# Patient Record
Sex: FEMALE | Race: WHITE | NOT HISPANIC OR LATINO | Employment: UNEMPLOYED | URBAN - NONMETROPOLITAN AREA
[De-identification: names, ages, dates, MRNs, and addresses within clinical notes are randomized per-mention and may not be internally consistent; named-entity substitution may affect disease eponyms.]

---

## 2017-09-23 ENCOUNTER — HOSPITAL ENCOUNTER (EMERGENCY)
Facility: HOSPITAL | Age: 37
Discharge: HOME | End: 2017-09-23
Admitting: EMERGENCY MEDICINE

## 2017-09-23 VITALS
TEMPERATURE: 98.8 F | HEART RATE: 93 BPM | SYSTOLIC BLOOD PRESSURE: 133 MMHG | HEIGHT: 64 IN | RESPIRATION RATE: 20 BRPM | WEIGHT: 150 LBS | DIASTOLIC BLOOD PRESSURE: 77 MMHG | BODY MASS INDEX: 25.61 KG/M2 | OXYGEN SATURATION: 98 %

## 2017-09-23 DIAGNOSIS — L02.91 ABSCESS: Primary | ICD-10-CM

## 2017-09-23 PROCEDURE — 99282 EMERGENCY DEPT VISIT SF MDM: CPT

## 2017-09-23 RX ORDER — CLINDAMYCIN HYDROCHLORIDE 300 MG/1
300 CAPSULE ORAL 3 TIMES DAILY
Qty: 21 CAPSULE | Refills: 0 | Status: SHIPPED | OUTPATIENT
Start: 2017-09-23 | End: 2017-09-30

## 2017-09-23 RX ORDER — PRENATAL VIT NO.126/IRON/FOLIC 28MG-0.8MG
TABLET ORAL DAILY
COMMUNITY
End: 2023-03-03

## 2017-09-23 NOTE — DISCHARGE INSTRUCTIONS
Please f/u with your OBGYN on Monday  Warm compresses to affected area 3-4x daily  R/t to the ER if any new or worsening symptoms

## 2017-09-24 NOTE — ED PROVIDER NOTES
Subjective   HPI Comments: Patient is a 37-year-old  female who is 22 weeks pregnant who presents ER today with complaint of vaginal abscess.  The patient reports that this is been occurring for the past 2 days.  The patient states that she did shave her vaginal area 3 days ago and the next day she began have some pain and swelling to the right side of her vaginal area.  The patient reports no drainage from the area.  The patient denies any discharge.  She denies any vaginal bleeding abdominal or back pain.  Patient presents ER today for further evaluation.    Patient is a 37 y.o. female presenting with female genitourinary complaint.   History provided by:  Patient   used: No    Female  Problem   Primary symptoms comment: vaginal abscess. There has been no fever. The current episode started 2 days ago. The problem occurs constantly. The problem has not changed since onset.Context: occurred after shaving area. She is pregnant (22 weeks pregnant). Prenatal care has been sporadic. Pertinent negatives include no anorexia, no diaphoresis, no abdominal swelling, no abdominal pain, no constipation, no diarrhea, no nausea, no vomiting, no frequency, no light-headedness and no dizziness. She has tried nothing for the symptoms. Sexual activity: non-contributory. She uses nothing for contraception. Associated medical issues do not include STD, PID, herpes simplex, perineal abscess, vaginosis, hypermenorrhea, ovarian cysts, endometriosis, gynecological surgery, ectopic pregnancy,  section, miscarriage or terminated pregnancy.       Review of Systems   Constitutional: Negative for diaphoresis.   Gastrointestinal: Negative for abdominal pain, anorexia, constipation, diarrhea, nausea and vomiting.   Genitourinary: Negative for frequency.   Neurological: Negative for dizziness and light-headedness.   All other systems reviewed and are negative.      History reviewed. No pertinent past medical  history.    No Known Allergies    Past Surgical History:   Procedure Laterality Date   •  SECTION     • FOOT SURGERY     • HAND SURGERY         History reviewed. No pertinent family history.    Social History     Social History   • Marital status:      Spouse name: N/A   • Number of children: N/A   • Years of education: N/A     Social History Main Topics   • Smoking status: Current Every Day Smoker     Packs/day: 0.50     Years: 10.00     Types: Cigarettes   • Smokeless tobacco: None   • Alcohol use No   • Drug use: No   • Sexual activity: Not Asked     Other Topics Concern   • None     Social History Narrative   • None           Objective   Physical Exam   Constitutional: She is oriented to person, place, and time. She appears well-developed and well-nourished.   HENT:   Head: Normocephalic and atraumatic.   Eyes: Conjunctivae are normal. Pupils are equal, round, and reactive to light.   Neck: Normal range of motion.   Cardiovascular: Normal rate, regular rhythm and normal heart sounds.    Pulmonary/Chest: Effort normal and breath sounds normal.   Abdominal: Soft. Bowel sounds are normal.   Genitourinary:         Musculoskeletal: Normal range of motion.   Neurological: She is alert and oriented to person, place, and time.   Skin: Skin is warm and dry.   Psychiatric: She has a normal mood and affect.   Nursing note and vitals reviewed.      Procedures         ED Course  ED Course   Comment By Time   I did offer to try to I&D the lesion patient was concerned about to the right labia majora.  The patient declined.  This time and treated with antibiotics.  Advised the patient that I recommend using Tylenol for pain she is pregnant.  Fetal heart tones were performed there 145.  The patient is advised to use sitz baths in warm compresses to the affected area.  Advised to follow-up with her OB/GYN on Monday or have advised the patient should return to this ER for further evaluation.  At this time she will  be discharged home in stable condition. Yue Owen, APRN 09/24 1853                  MDM  Number of Diagnoses or Management Options  Abscess: new and requires workup  Patient Progress  Patient progress: stable      Final diagnoses:   Abscess            Yue Owen, APRN  09/24/17 1962

## 2023-03-02 NOTE — DISCHARGE INSTRUCTIONS
UPPER EXTREMITY POST-OP INSTRUCTIONS - DR. HE    IMPORTANT PHONE NUMBERS:   For emergencies, please call [phone]   You may reach Dr. He and clinical staff at [phone]- M-F 8:00 am-5:00 pm   After 5pm or on the weekends, please call [phone]   Call immediately if you have any of the following symptoms:     Elevated temperature above 101.5 degrees for more than 48 hours after surgery     Persistent drainage from wound     Severe pain around surgical site    Sling use: The sling is provided for your comfort and to ensure proper healing of your repair following surgery. Please place the abduction pillow with the curved side against your side and the sling on the side of the pillow. Your surgery requires that you wear the sling if noted below.  ____ For comfort. Remove sling 24 hours and begin range of motion exercises  ____ At all times except bathing, dressing, and therapy. Also wear the sling during sleep.  _x___ No sling required    Bathing:  ___No bandages, no restrictions!!  _x__You may remove you dressing and shower on the 3rd day after surgery (Ex. Tu surgery, shower on Friday)  ** if you are told to it is ok to remove your dressing and shower, DO NOT SOAK your incisions in a tub.  ___Keep splint clean, dry, and intact. DO NOT place foreign objects into your splint.      Dressings: Keep dressing/splint intact unless instructed otherwise below. SOME DRAINAGE IS NORMAL!     DO NOT touch or apply ointment to the incision.     DO NOT remove the steri-strips over the incisions (if you have steri-strips). They will         generally fall off on their own or can be removed 1 weeksafter surgery.     If you have yellow gauze and it comes off, do not worry about it. Leave them off.    Signs of infection that warrant a phone call to our clinical line:     o Excessive drainage or redness     o Red streaking coming away from the incision  o Increased pain  o Increased temperature above 101  degrees      Physical Therapy:        *  Your physical therapy status will be discussed with you postoperatively and at your first post-op appointment. Some injuries will not require physical therapy.      *  If you have a shoulder manipulation, please schedule therapy for the next day      Medications: You will be discharged with the appropriate medications following your surgery. Fill these at the pharmacy and take them as directed on the label. Not all of the medications below may be prescribed. Occasionally, other medications may be prescribed with specific instructions.    Percocet/Lortab (oxycodone/hydrocodone with tylenol) - Pain Medication, will cause drowsiness, possibly itchiness (this is NOT an allergy - use benadryl or an over the counter allergy medication such as Claritin or Zyrtec)     o Take 1-2 tablets every 4-6 hours. DO NOT EXCEED 4,000mg of Tylenol in 24 hours.  **DO NOT MIX WITH ALCOHOL, DRIVE WHILE TAKING, OR TAKE with extra TYLENOL**    Colace (Docusate) - stool softener, used for constipation. Take this only if you feel constipated.      Zofran (Ondansetron) or Phenergan - Anti-nausea medication, will cause drowsiness      *Starting January 2021, all narcotic medication must be prescribed electronically to your pharmacy.  Be sure to notify nursing of your preferred pharmacy.  If you are running low on pain medications, please notify us if you need a refill 24-48 hours prior to when you run out, so we can make arrangements to refill the prescription for you if we determine is necessary

## 2023-03-03 ENCOUNTER — PRE-ADMISSION TESTING (OUTPATIENT)
Dept: PREADMISSION TESTING | Facility: HOSPITAL | Age: 43
End: 2023-03-03
Payer: COMMERCIAL

## 2023-03-03 VITALS
DIASTOLIC BLOOD PRESSURE: 63 MMHG | HEART RATE: 79 BPM | HEIGHT: 66 IN | SYSTOLIC BLOOD PRESSURE: 102 MMHG | BODY MASS INDEX: 21.01 KG/M2 | WEIGHT: 130.73 LBS | OXYGEN SATURATION: 99 % | RESPIRATION RATE: 16 BRPM

## 2023-03-03 PROBLEM — M67.431 GANGLION CYST OF DORSUM OF RIGHT WRIST: Status: ACTIVE | Noted: 2023-03-03

## 2023-03-03 LAB
DEPRECATED RDW RBC AUTO: 40.9 FL (ref 37–54)
ERYTHROCYTE [DISTWIDTH] IN BLOOD BY AUTOMATED COUNT: 12.3 % (ref 12.3–15.4)
HCT VFR BLD AUTO: 43 % (ref 34–46.6)
HGB BLD-MCNC: 13.9 G/DL (ref 12–15.9)
MCH RBC QN AUTO: 29.4 PG (ref 26.6–33)
MCHC RBC AUTO-ENTMCNC: 32.3 G/DL (ref 31.5–35.7)
MCV RBC AUTO: 90.9 FL (ref 79–97)
PLATELET # BLD AUTO: 283 10*3/MM3 (ref 140–450)
PMV BLD AUTO: 10.5 FL (ref 6–12)
RBC # BLD AUTO: 4.73 10*6/MM3 (ref 3.77–5.28)
WBC NRBC COR # BLD: 7.46 10*3/MM3 (ref 3.4–10.8)

## 2023-03-03 PROCEDURE — 36415 COLL VENOUS BLD VENIPUNCTURE: CPT

## 2023-03-03 PROCEDURE — 85027 COMPLETE CBC AUTOMATED: CPT

## 2023-03-03 NOTE — OP NOTE
Patient Name: Temitope  : 1980  MRN: [id number]    DATE of SURGERY: 3/7/2023    SURGEON: Clovis Russo MD    ASSISTANT: NONE    PREOPERATIVE DIAGNOSIS:  Right wrist dorsal ganglion cyst    POSTOPERATIVE DIAGNOSIS:  Right wrist dorsal ganglion cyst.     PROCEDURE PERFORMED: Excision of Right wrist dorsal ganglion cyst.     IMPLANTS  None.     ANESTHESIA USED  Laryngeal mask airway.     OPERATIVE INDICATIONS  The patient is a 42 y.o. female who presented to the clinic with complaints of a cyst overlying the wrist. The cyst had been present for several months and was causing  pain and discomfort, and the patient wished to have it removed. We discussed this in detail with the risks, benefits and alternatives. Risks included, but are not limited to, that of anesthesia, bleeding, infection, pain, damage to local structure, need for further surgery, cyst recurrence, damage to the radial artery and its branches as well as the superficial radial nerve.     ESTIMATED BLOOD LOSS    Less than 10 mL.     SPECIMENS  None.     DRAINS  None.     COMPLICATIONS  None.     PROCEDURE IN DETAIL  The patient was seen in the preoperative holding room; once again the informed consent form was reviewed with the patient and signed. The site of surgery was marked with the patient's agreement. The patient was transported to the operating room where a time out was performed identifying the correct patient as well as the operative site. IV Kefzol 1 g was given as perioperative antibiotics.    The upper extremity was prepped and draped in usual sterile fashion.   Tourniquet was placed on the right brachium and inflated to 200 mmHg and total tourniquet time was less than 30 minutes.     An incision was made overlying the cyst and the cyst was readily identified. It was circumferentially dissected, traced down to the level of the joint space. The entire cyst was removed without complication. The stalk on the cyst was excised and a  small portion of the joint capsule was removed to prevent cyst recurrence.     The incision was irrigated followed by closing in layers. The skin was closed with adhesive glue. Sterile dressing was placed. She was awakened from anesthesia, transported to the recovery room in stable condition.     POSTOPERATIVE PLAN: Discharge home with family to follow up in 2 weeks for a clinical check.    Electronically signed by Clovis Russo MD on 3/7/2023 at 08:42 CST

## 2023-03-07 ENCOUNTER — ANESTHESIA (OUTPATIENT)
Dept: PERIOP | Facility: HOSPITAL | Age: 43
End: 2023-03-07
Payer: COMMERCIAL

## 2023-03-07 ENCOUNTER — ANESTHESIA EVENT (OUTPATIENT)
Dept: PERIOP | Facility: HOSPITAL | Age: 43
End: 2023-03-07
Payer: COMMERCIAL

## 2023-03-07 ENCOUNTER — HOSPITAL ENCOUNTER (OUTPATIENT)
Facility: HOSPITAL | Age: 43
Setting detail: HOSPITAL OUTPATIENT SURGERY
Discharge: HOME | End: 2023-03-07
Attending: ORTHOPAEDIC SURGERY | Admitting: ORTHOPAEDIC SURGERY
Payer: COMMERCIAL

## 2023-03-07 VITALS
OXYGEN SATURATION: 96 % | RESPIRATION RATE: 16 BRPM | DIASTOLIC BLOOD PRESSURE: 68 MMHG | TEMPERATURE: 97 F | SYSTOLIC BLOOD PRESSURE: 103 MMHG | HEART RATE: 68 BPM

## 2023-03-07 DIAGNOSIS — M67.431 GANGLION CYST OF DORSUM OF RIGHT WRIST: Primary | ICD-10-CM

## 2023-03-07 LAB — B-HCG UR QL: NEGATIVE

## 2023-03-07 PROCEDURE — 25010000002 ONDANSETRON PER 1 MG: Performed by: NURSE ANESTHETIST, CERTIFIED REGISTERED

## 2023-03-07 PROCEDURE — 25010000002 KETOROLAC TROMETHAMINE PER 15 MG: Performed by: NURSE ANESTHETIST, CERTIFIED REGISTERED

## 2023-03-07 PROCEDURE — 25010000002 CEFAZOLIN PER 500 MG: Performed by: ORTHOPAEDIC SURGERY

## 2023-03-07 PROCEDURE — 81025 URINE PREGNANCY TEST: CPT | Performed by: ORTHOPAEDIC SURGERY

## 2023-03-07 PROCEDURE — 25010000002 PROPOFOL 10 MG/ML EMULSION: Performed by: NURSE ANESTHETIST, CERTIFIED REGISTERED

## 2023-03-07 PROCEDURE — 25010000002 DEXAMETHASONE PER 1 MG: Performed by: NURSE ANESTHETIST, CERTIFIED REGISTERED

## 2023-03-07 PROCEDURE — 25010000002 FENTANYL CITRATE (PF) 100 MCG/2ML SOLUTION: Performed by: NURSE ANESTHETIST, CERTIFIED REGISTERED

## 2023-03-07 RX ORDER — SODIUM CHLORIDE 0.9 % (FLUSH) 0.9 %
10 SYRINGE (ML) INJECTION AS NEEDED
Status: DISCONTINUED | OUTPATIENT
Start: 2023-03-07 | End: 2023-03-07 | Stop reason: HOSPADM

## 2023-03-07 RX ORDER — OXYCODONE HYDROCHLORIDE AND ACETAMINOPHEN 5; 325 MG/1; MG/1
1 TABLET ORAL EVERY 6 HOURS PRN
Qty: 15 TABLET | Refills: 0 | Status: SHIPPED | OUTPATIENT
Start: 2023-03-07

## 2023-03-07 RX ORDER — SODIUM CHLORIDE, SODIUM LACTATE, POTASSIUM CHLORIDE, CALCIUM CHLORIDE 600; 310; 30; 20 MG/100ML; MG/100ML; MG/100ML; MG/100ML
100 INJECTION, SOLUTION INTRAVENOUS CONTINUOUS PRN
Status: DISCONTINUED | OUTPATIENT
Start: 2023-03-07 | End: 2023-03-07 | Stop reason: HOSPADM

## 2023-03-07 RX ORDER — SODIUM CHLORIDE, SODIUM LACTATE, POTASSIUM CHLORIDE, CALCIUM CHLORIDE 600; 310; 30; 20 MG/100ML; MG/100ML; MG/100ML; MG/100ML
100 INJECTION, SOLUTION INTRAVENOUS CONTINUOUS
Status: DISCONTINUED | OUTPATIENT
Start: 2023-03-07 | End: 2023-03-07 | Stop reason: HOSPADM

## 2023-03-07 RX ORDER — ONDANSETRON 4 MG/1
4 TABLET, FILM COATED ORAL EVERY 8 HOURS PRN
Qty: 10 TABLET | Refills: 0 | Status: SHIPPED | OUTPATIENT
Start: 2023-03-07

## 2023-03-07 RX ORDER — NALOXONE HCL 0.4 MG/ML
0.4 VIAL (ML) INJECTION AS NEEDED
Status: DISCONTINUED | OUTPATIENT
Start: 2023-03-07 | End: 2023-03-07 | Stop reason: HOSPADM

## 2023-03-07 RX ORDER — FENTANYL CITRATE 50 UG/ML
25 INJECTION, SOLUTION INTRAMUSCULAR; INTRAVENOUS
Status: DISCONTINUED | OUTPATIENT
Start: 2023-03-07 | End: 2023-03-07 | Stop reason: HOSPADM

## 2023-03-07 RX ORDER — ONDANSETRON 2 MG/ML
4 INJECTION INTRAMUSCULAR; INTRAVENOUS ONCE AS NEEDED
Status: DISCONTINUED | OUTPATIENT
Start: 2023-03-07 | End: 2023-03-07 | Stop reason: HOSPADM

## 2023-03-07 RX ORDER — LIDOCAINE HYDROCHLORIDE 10 MG/ML
0.5 INJECTION, SOLUTION EPIDURAL; INFILTRATION; INTRACAUDAL; PERINEURAL ONCE AS NEEDED
Status: DISCONTINUED | OUTPATIENT
Start: 2023-03-07 | End: 2023-03-07 | Stop reason: HOSPADM

## 2023-03-07 RX ORDER — DEXAMETHASONE SODIUM PHOSPHATE 4 MG/ML
INJECTION, SOLUTION INTRA-ARTICULAR; INTRALESIONAL; INTRAMUSCULAR; INTRAVENOUS; SOFT TISSUE AS NEEDED
Status: DISCONTINUED | OUTPATIENT
Start: 2023-03-07 | End: 2023-03-07 | Stop reason: SURG

## 2023-03-07 RX ORDER — ACETAMINOPHEN 500 MG
1000 TABLET ORAL ONCE
Status: COMPLETED | OUTPATIENT
Start: 2023-03-07 | End: 2023-03-07

## 2023-03-07 RX ORDER — FLUMAZENIL 0.1 MG/ML
0.2 INJECTION INTRAVENOUS AS NEEDED
Status: DISCONTINUED | OUTPATIENT
Start: 2023-03-07 | End: 2023-03-07 | Stop reason: HOSPADM

## 2023-03-07 RX ORDER — FENTANYL CITRATE 50 UG/ML
INJECTION, SOLUTION INTRAMUSCULAR; INTRAVENOUS AS NEEDED
Status: DISCONTINUED | OUTPATIENT
Start: 2023-03-07 | End: 2023-03-07 | Stop reason: SURG

## 2023-03-07 RX ORDER — DROPERIDOL 2.5 MG/ML
0.62 INJECTION, SOLUTION INTRAMUSCULAR; INTRAVENOUS ONCE AS NEEDED
Status: DISCONTINUED | OUTPATIENT
Start: 2023-03-07 | End: 2023-03-07 | Stop reason: HOSPADM

## 2023-03-07 RX ORDER — SODIUM CHLORIDE 9 MG/ML
40 INJECTION, SOLUTION INTRAVENOUS AS NEEDED
Status: DISCONTINUED | OUTPATIENT
Start: 2023-03-07 | End: 2023-03-07 | Stop reason: HOSPADM

## 2023-03-07 RX ORDER — SODIUM CHLORIDE 0.9 % (FLUSH) 0.9 %
3-10 SYRINGE (ML) INJECTION AS NEEDED
Status: DISCONTINUED | OUTPATIENT
Start: 2023-03-07 | End: 2023-03-07 | Stop reason: HOSPADM

## 2023-03-07 RX ORDER — OXYCODONE AND ACETAMINOPHEN 10; 325 MG/1; MG/1
1 TABLET ORAL ONCE AS NEEDED
Status: DISCONTINUED | OUTPATIENT
Start: 2023-03-07 | End: 2023-03-07 | Stop reason: HOSPADM

## 2023-03-07 RX ORDER — SODIUM CHLORIDE 0.9 % (FLUSH) 0.9 %
3 SYRINGE (ML) INJECTION EVERY 12 HOURS SCHEDULED
Status: DISCONTINUED | OUTPATIENT
Start: 2023-03-07 | End: 2023-03-07 | Stop reason: HOSPADM

## 2023-03-07 RX ORDER — LABETALOL HYDROCHLORIDE 5 MG/ML
5 INJECTION, SOLUTION INTRAVENOUS
Status: DISCONTINUED | OUTPATIENT
Start: 2023-03-07 | End: 2023-03-07 | Stop reason: HOSPADM

## 2023-03-07 RX ORDER — MIDAZOLAM HYDROCHLORIDE 1 MG/ML
1 INJECTION INTRAMUSCULAR; INTRAVENOUS
Status: DISCONTINUED | OUTPATIENT
Start: 2023-03-07 | End: 2023-03-07 | Stop reason: HOSPADM

## 2023-03-07 RX ORDER — KETOROLAC TROMETHAMINE 30 MG/ML
INJECTION, SOLUTION INTRAMUSCULAR; INTRAVENOUS AS NEEDED
Status: DISCONTINUED | OUTPATIENT
Start: 2023-03-07 | End: 2023-03-07 | Stop reason: SURG

## 2023-03-07 RX ORDER — SODIUM CHLORIDE 0.9 % (FLUSH) 0.9 %
10 SYRINGE (ML) INJECTION EVERY 12 HOURS SCHEDULED
Status: DISCONTINUED | OUTPATIENT
Start: 2023-03-07 | End: 2023-03-07 | Stop reason: HOSPADM

## 2023-03-07 RX ORDER — LIDOCAINE HYDROCHLORIDE 20 MG/ML
INJECTION, SOLUTION EPIDURAL; INFILTRATION; INTRACAUDAL; PERINEURAL AS NEEDED
Status: DISCONTINUED | OUTPATIENT
Start: 2023-03-07 | End: 2023-03-07 | Stop reason: SURG

## 2023-03-07 RX ORDER — MAGNESIUM HYDROXIDE 1200 MG/15ML
LIQUID ORAL AS NEEDED
Status: DISCONTINUED | OUTPATIENT
Start: 2023-03-07 | End: 2023-03-07 | Stop reason: HOSPADM

## 2023-03-07 RX ORDER — ONDANSETRON 2 MG/ML
INJECTION INTRAMUSCULAR; INTRAVENOUS AS NEEDED
Status: DISCONTINUED | OUTPATIENT
Start: 2023-03-07 | End: 2023-03-07 | Stop reason: SURG

## 2023-03-07 RX ORDER — SODIUM CHLORIDE, SODIUM LACTATE, POTASSIUM CHLORIDE, CALCIUM CHLORIDE 600; 310; 30; 20 MG/100ML; MG/100ML; MG/100ML; MG/100ML
1000 INJECTION, SOLUTION INTRAVENOUS CONTINUOUS
Status: DISCONTINUED | OUTPATIENT
Start: 2023-03-07 | End: 2023-03-07 | Stop reason: HOSPADM

## 2023-03-07 RX ORDER — IBUPROFEN 600 MG/1
600 TABLET ORAL ONCE AS NEEDED
Status: DISCONTINUED | OUTPATIENT
Start: 2023-03-07 | End: 2023-03-07 | Stop reason: HOSPADM

## 2023-03-07 RX ORDER — PROPOFOL 10 MG/ML
VIAL (ML) INTRAVENOUS AS NEEDED
Status: DISCONTINUED | OUTPATIENT
Start: 2023-03-07 | End: 2023-03-07 | Stop reason: SURG

## 2023-03-07 RX ORDER — OXYCODONE AND ACETAMINOPHEN 7.5; 325 MG/1; MG/1
2 TABLET ORAL EVERY 4 HOURS PRN
Status: DISCONTINUED | OUTPATIENT
Start: 2023-03-07 | End: 2023-03-07 | Stop reason: HOSPADM

## 2023-03-07 RX ADMIN — DEXAMETHASONE SODIUM PHOSPHATE 4 MG: 4 INJECTION, SOLUTION INTRA-ARTICULAR; INTRALESIONAL; INTRAMUSCULAR; INTRAVENOUS; SOFT TISSUE at 08:25

## 2023-03-07 RX ADMIN — SODIUM CHLORIDE, POTASSIUM CHLORIDE, SODIUM LACTATE AND CALCIUM CHLORIDE 100 ML/HR: 600; 310; 30; 20 INJECTION, SOLUTION INTRAVENOUS at 06:38

## 2023-03-07 RX ADMIN — FENTANYL CITRATE 50 MCG: 50 INJECTION INTRAMUSCULAR; INTRAVENOUS at 08:22

## 2023-03-07 RX ADMIN — LIDOCAINE HYDROCHLORIDE 100 MG: 20 INJECTION, SOLUTION EPIDURAL; INFILTRATION; INTRACAUDAL; PERINEURAL at 08:11

## 2023-03-07 RX ADMIN — ACETAMINOPHEN 1000 MG: 500 TABLET, FILM COATED ORAL at 07:43

## 2023-03-07 RX ADMIN — FENTANYL CITRATE 50 MCG: 50 INJECTION INTRAMUSCULAR; INTRAVENOUS at 08:16

## 2023-03-07 RX ADMIN — KETOROLAC TROMETHAMINE 30 MG: 30 INJECTION, SOLUTION INTRAMUSCULAR; INTRAVENOUS at 08:25

## 2023-03-07 RX ADMIN — OXYCODONE AND ACETAMINOPHEN 2 TABLET: 7.5; 325 TABLET ORAL at 08:59

## 2023-03-07 RX ADMIN — PROPOFOL INJECTABLE EMULSION 200 MG: 10 INJECTION, EMULSION INTRAVENOUS at 08:11

## 2023-03-07 RX ADMIN — ONDANSETRON 4 MG: 2 INJECTION INTRAMUSCULAR; INTRAVENOUS at 08:25

## 2023-03-07 NOTE — ANESTHESIA POSTPROCEDURE EVALUATION
Patient: Ani Chávez    Procedure Summary     Date: 03/07/23 Room / Location:  [facility] OR 09 /  [facility]    Anesthesia Start: 0809 Anesthesia Stop: 0843    Procedure: RIGHT WRIST EXCISION OF GANGLION CYST (Right: Wrist) Diagnosis:       Ganglion cyst of dorsum of right wrist      (M67.431)    Surgeons: Clovis Russo MD Provider: Freddie Morales CRNA    Anesthesia Type: general ASA Status: 2          Anesthesia Type: general    Vitals  Vitals Value Taken Time   /71 03/07/23 0900   Temp 97 °F (36.1 °C) 03/07/23 0900   Pulse 84 03/07/23 0904   Resp 14 03/07/23 0900   SpO2 97 % 03/07/23 0904   Vitals shown include unvalidated device data.        Post Anesthesia Care and Evaluation    Patient location during evaluation: PACU  Patient participation: complete - patient participated  Level of consciousness: awake and alert  Pain management: adequate    Airway patency: patent  Anesthetic complications: No anesthetic complications    Cardiovascular status: acceptable  Respiratory status: acceptable  Hydration status: acceptable    Comments: Blood pressure 103/68, pulse 68, temperature 97 °F (36.1 °C), temperature source Temporal, resp. rate 16, last menstrual period 02/10/2023, SpO2 96 %, unknown if currently breastfeeding.    Pt discharged from PACU based on ml score >8  No anesthesia care post op

## 2023-03-07 NOTE — H&P
Pt Name: Ani Chávez  MRN: [id number]  YOB: 1980  Date of evaluation: 3/7/2023    H&P including current review of systems was updated in the paper chart and/or the document previously scanned into the record.  There have been no significant changes or new problems since the original evaluation.  The patient's problems continue and indications for contemplated procedure have not changed.    Electronically signed by Clovis Russo MD on 3/7/2023 at 07:40 CST

## 2023-03-07 NOTE — ANESTHESIA PREPROCEDURE EVALUATION
Anesthesia Evaluation     Patient summary reviewed and Nursing notes reviewed   no history of anesthetic complications:  NPO Solid Status: > 8 hours  NPO Liquid Status: > 8 hours           Airway   Mallampati: I  TM distance: >3 FB  Neck ROM: full  No difficulty expected  Dental      Pulmonary    (+) a smoker Current,   (-) sleep apnea  Cardiovascular   Exercise tolerance: good (4-7 METS)    (-) hypertension, CAD      Neuro/Psych  (+) psychiatric history Anxiety,    (-) seizures, TIA, CVA  GI/Hepatic/Renal/Endo    (-) no renal disease, diabetes    Musculoskeletal     Abdominal    Substance History      OB/GYN          Other                        Anesthesia Plan    ASA 2     general     intravenous induction     Anesthetic plan, risks, benefits, and alternatives have been provided, discussed and informed consent has been obtained with: patient.        CODE STATUS:

## 2023-03-07 NOTE — ANESTHESIA PROCEDURE NOTES
Airway  Urgency: elective    Date/Time: 3/7/2023 8:12 AM  Airway not difficult    General Information and Staff    Patient location during procedure: OR  CRNA/CAA: Freddie Morales CRNA    Indications and Patient Condition  Indications for airway management: airway protection    Preoxygenated: yes  Mask difficulty assessment: 1 - vent by mask    Final Airway Details  Final airway type: supraglottic airway      Successful airway: classic and I-gel  Size 3     Number of attempts at approach: 1

## 2023-03-07 NOTE — BRIEF OP NOTE
WRIST GANGLION EXCISION  Progress Note    Ani Chávez  3/7/2023    Pre-op Diagnosis:   M67.431       Post-Op Diagnosis Codes:     * Ganglion cyst of dorsum of right wrist [M67.431]    Procedure/CPT® Codes:  ID EXCISION GANGLION WRIST DORSAL/VOLAR PRIMARY [86540]      Procedure(s):  RIGHT WRIST EXCISION OF GANGLION CYST        Surgeon(s):  Clovis Russo MD    Anesthesia: General    Staff:   Circulator: Anamaria Gusman RN  Scrub Person: Christine Pepe; Naomi Serrato         Estimated Blood Loss: minimal    Urine Voided: * No values recorded between 3/7/2023  8:09 AM and 3/7/2023  8:35 AM *    Specimens:                None          Drains: * No LDAs found *    Findings: see op note         Complications: none          Clovis Russo MD     Date: 3/7/2023  Time: 08:38 CST

## 2023-04-11 PROBLEM — H91.8X9 ASYMMETRICAL HEARING LOSS: Status: ACTIVE | Noted: 2023-04-11

## 2023-05-03 ENCOUNTER — HOSPITAL ENCOUNTER (OUTPATIENT)
Dept: CT IMAGING | Age: 43
Discharge: HOME | End: 2023-05-03
Payer: MEDICAID

## 2023-05-03 DIAGNOSIS — H91.8X9 ASYMMETRICAL HEARING LOSS: ICD-10-CM

## 2023-05-03 PROCEDURE — 70481 CT ORBIT/EAR/FOSSA W/DYE: CPT

## 2023-05-03 PROCEDURE — 6360000004 HC RX CONTRAST MEDICATION: Performed by: PHYSICIAN ASSISTANT

## 2023-05-03 RX ADMIN — IOPAMIDOL 75 ML: 755 INJECTION, SOLUTION INTRAVENOUS at 08:48

## 2023-05-10 ENCOUNTER — TELEPHONE (OUTPATIENT)
Dept: ENT CLINIC | Age: 43
End: 2023-05-10

## 2023-05-10 NOTE — TELEPHONE ENCOUNTER
Pt called for results of imaging. Per Chris Batres results are normal and he recommends a hearing aid trial. Advised pt on local stores and she will callback if she needs us PRN.

## 2023-05-10 NOTE — TELEPHONE ENCOUNTER
CT of the IAC was personally reviewed and demonstrated no obvious evidence of acoustic neuroma. Patient called and talked to Sadie Britton today and was given the info. She was recommended hearing aid trials due to her hearing loss. She was reminded to call if she has any questions or problems.       Electronically signed by Vijay Monsalve PA-C on 5/10/23 at 5:29 PM CDT

## 2023-09-26 ENCOUNTER — TRANSCRIBE ORDERS (OUTPATIENT)
Dept: ADMINISTRATIVE | Facility: HOSPITAL | Age: 43
End: 2023-09-26
Payer: COMMERCIAL

## 2023-09-26 DIAGNOSIS — Z12.31 ENCOUNTER FOR SCREENING MAMMOGRAM FOR MALIGNANT NEOPLASM OF BREAST: ICD-10-CM

## 2023-09-26 DIAGNOSIS — M41.9 SCOLIOSIS, UNSPECIFIED SCOLIOSIS TYPE, UNSPECIFIED SPINAL REGION: Primary | ICD-10-CM

## 2023-10-09 LAB
NCCN CRITERIA FLAG: NORMAL
TYRER CUZICK SCORE: 8.1

## 2023-10-10 ENCOUNTER — HOSPITAL ENCOUNTER (OUTPATIENT)
Dept: MAMMOGRAPHY | Facility: HOSPITAL | Age: 43
Discharge: HOME | End: 2023-10-10
Admitting: NURSE PRACTITIONER
Payer: COMMERCIAL

## 2023-10-10 DIAGNOSIS — Z12.31 ENCOUNTER FOR SCREENING MAMMOGRAM FOR MALIGNANT NEOPLASM OF BREAST: ICD-10-CM

## 2023-10-10 PROCEDURE — 77067 SCR MAMMO BI INCL CAD: CPT

## 2023-10-10 PROCEDURE — 77063 BREAST TOMOSYNTHESIS BI: CPT

## 2023-10-23 ENCOUNTER — OFFICE VISIT (OUTPATIENT)
Dept: OBSTETRICS AND GYNECOLOGY | Facility: CLINIC | Age: 43
End: 2023-10-23
Payer: COMMERCIAL

## 2023-10-23 ENCOUNTER — HOSPITAL ENCOUNTER (OUTPATIENT)
Dept: GENERAL RADIOLOGY | Facility: HOSPITAL | Age: 43
Discharge: HOME | End: 2023-10-23
Payer: COMMERCIAL

## 2023-10-23 ENCOUNTER — LAB (OUTPATIENT)
Dept: LAB | Facility: HOSPITAL | Age: 43
End: 2023-10-23
Payer: COMMERCIAL

## 2023-10-23 VITALS
HEIGHT: 64 IN | DIASTOLIC BLOOD PRESSURE: 70 MMHG | BODY MASS INDEX: 23.39 KG/M2 | WEIGHT: 137 LBS | SYSTOLIC BLOOD PRESSURE: 118 MMHG

## 2023-10-23 DIAGNOSIS — Z00.00 ANNUAL PHYSICAL EXAM: Primary | ICD-10-CM

## 2023-10-23 DIAGNOSIS — Z30.09 ENCOUNTER FOR COUNSELING REGARDING CONTRACEPTION: ICD-10-CM

## 2023-10-23 DIAGNOSIS — N95.1 MENOPAUSAL SYMPTOM: Primary | ICD-10-CM

## 2023-10-23 DIAGNOSIS — M41.9 SCOLIOSIS, UNSPECIFIED SCOLIOSIS TYPE, UNSPECIFIED SPINAL REGION: ICD-10-CM

## 2023-10-23 DIAGNOSIS — R53.83 FATIGUE, UNSPECIFIED TYPE: ICD-10-CM

## 2023-10-23 DIAGNOSIS — Z12.31 ENCOUNTER FOR SCREENING MAMMOGRAM FOR BREAST CANCER: ICD-10-CM

## 2023-10-23 DIAGNOSIS — N95.1 MENOPAUSAL SYMPTOMS: ICD-10-CM

## 2023-10-23 LAB
B-HCG UR QL: NEGATIVE
ESTRADIOL SERPL HS-MCNC: 44 PG/ML
EXPIRATION DATE: NORMAL
FSH SERPL-ACNC: 3.26 MIU/ML
INTERNAL NEGATIVE CONTROL: NEGATIVE
INTERNAL POSITIVE CONTROL: POSITIVE
LH SERPL-ACNC: 4.64 MIU/ML
Lab: NORMAL
PROGEST SERPL-MCNC: 1.86 NG/ML
T4 FREE SERPL-MCNC: 1.12 NG/DL (ref 0.93–1.7)
TESTOST SERPL-MCNC: 4.04 NG/DL (ref 8.4–48.1)
TSH SERPL DL<=0.05 MIU/L-ACNC: 0.95 UIU/ML (ref 0.27–4.2)

## 2023-10-23 PROCEDURE — 84479 ASSAY OF THYROID (T3 OR T4): CPT

## 2023-10-23 PROCEDURE — 82627 DEHYDROEPIANDROSTERONE: CPT

## 2023-10-23 PROCEDURE — 36415 COLL VENOUS BLD VENIPUNCTURE: CPT

## 2023-10-23 PROCEDURE — 72082 X-RAY EXAM ENTIRE SPI 2/3 VW: CPT

## 2023-10-23 PROCEDURE — 83002 ASSAY OF GONADOTROPIN (LH): CPT

## 2023-10-23 PROCEDURE — 84443 ASSAY THYROID STIM HORMONE: CPT

## 2023-10-23 PROCEDURE — 84439 ASSAY OF FREE THYROXINE: CPT

## 2023-10-23 PROCEDURE — 84403 ASSAY OF TOTAL TESTOSTERONE: CPT

## 2023-10-23 PROCEDURE — 83001 ASSAY OF GONADOTROPIN (FSH): CPT

## 2023-10-23 PROCEDURE — 84144 ASSAY OF PROGESTERONE: CPT

## 2023-10-23 PROCEDURE — G0123 SCREEN CERV/VAG THIN LAYER: HCPCS

## 2023-10-23 PROCEDURE — 82670 ASSAY OF TOTAL ESTRADIOL: CPT

## 2023-10-23 NOTE — PATIENT INSTRUCTIONS
Preventive Care 40-64 Years Old, Female  Preventive care refers to lifestyle choices and visits with your health care provider that can promote health and wellness. Preventive care visits are also called wellness exams.  What can I expect for my preventive care visit?  Counseling  Your health care provider may ask you questions about your:  Medical history, including:  Past medical problems.  Family medical history.  Pregnancy history.  Current health, including:  Menstrual cycle.  Method of birth control.  Emotional well-being.  Home life and relationship well-being.  Sexual activity and sexual health.  Lifestyle, including:  Alcohol, nicotine or tobacco, and drug use.  Access to firearms.  Diet, exercise, and sleep habits.  Work and work environment.  Sunscreen use.  Safety issues such as seatbelt and bike helmet use.  Physical exam  Your health care provider will check your:  Height and weight. These may be used to calculate your BMI (body mass index). BMI is a measurement that tells if you are at a healthy weight.  Waist circumference. This measures the distance around your waistline. This measurement also tells if you are at a healthy weight and may help predict your risk of certain diseases, such as type 2 diabetes and high blood pressure.  Heart rate and blood pressure.  Body temperature.  Skin for abnormal spots.  What immunizations do I need?    Vaccines are usually given at various ages, according to a schedule. Your health care provider will recommend vaccines for you based on your age, medical history, and lifestyle or other factors, such as travel or where you work.  What tests do I need?  Screening  Your health care provider may recommend screening tests for certain conditions. This may include:  Lipid and cholesterol levels.  Diabetes screening. This is done by checking your blood sugar (glucose) after you have not eaten for a while (fasting).  Pelvic exam and Pap test.  Hepatitis B test.  Hepatitis C  test.  HIV (human immunodeficiency virus) test.  STI (sexually transmitted infection) testing, if you are at risk.  Lung cancer screening.  Colorectal cancer screening.  Mammogram. Talk with your health care provider about when you should start having regular mammograms. This may depend on whether you have a family history of breast cancer.  BRCA-related cancer screening. This may be done if you have a family history of breast, ovarian, tubal, or peritoneal cancers.  Bone density scan. This is done to screen for osteoporosis.  Talk with your health care provider about your test results, treatment options, and if necessary, the need for more tests.  Follow these instructions at home:  Eating and drinking    Eat a diet that includes fresh fruits and vegetables, whole grains, lean protein, and low-fat dairy products.  Take vitamin and mineral supplements as recommended by your health care provider.  Do not drink alcohol if:  Your health care provider tells you not to drink.  You are pregnant, may be pregnant, or are planning to become pregnant.  If you drink alcohol:  Limit how much you have to 0-1 drink a day.  Know how much alcohol is in your drink. In the [place] one drink equals one 12 oz bottle of beer (355 mL), one 5 oz glass of wine (148 mL), or one 1½ oz glass of hard liquor (44 mL).  Lifestyle  Brush your teeth every morning and night with fluoride toothpaste. Floss one time each day.  Exercise for at least 30 minutes 5 or more days each week.  Do not use any products that contain nicotine or tobacco. These products include cigarettes, chewing tobacco, and vaping devices, such as e-cigarettes. If you need help quitting, ask your health care provider.  Do not use drugs.  If you are sexually active, practice safe sex. Use a condom or other form of protection to prevent STIs.  If you do not wish to become pregnant, use a form of birth control. If you plan to become pregnant, see your health care provider for a  prepregnancy visit.  Take aspirin only as told by your health care provider. Make sure that you understand how much to take and what form to take. Work with your health care provider to find out whether it is safe and beneficial for you to take aspirin daily.  Find healthy ways to manage stress, such as:  Meditation, yoga, or listening to music.  Journaling.  Talking to a trusted person.  Spending time with friends and family.  Minimize exposure to UV radiation to reduce your risk of skin cancer.  Safety  Always wear your seat belt while driving or riding in a vehicle.  Do not drive:  If you have been drinking alcohol. Do not ride with someone who has been drinking.  When you are tired or distracted.  While texting.  If you have been using any mind-altering substances or drugs.  Wear a helmet and other protective equipment during sports activities.  If you have firearms in your house, make sure you follow all gun safety procedures.  Seek help if you have been physically or sexually abused.  What's next?  Visit your health care provider once a year for an annual wellness visit.  Ask your health care provider how often you should have your eyes and teeth checked.  Stay up to date on all vaccines.  This information is not intended to replace advice given to you by your health care provider. Make sure you discuss any questions you have with your health care provider.  Document Revised: 06/15/2022 Document Reviewed: 06/15/2022  [employer]vier Patient Education © 2023 [employer]

## 2023-10-23 NOTE — PROGRESS NOTES
"Subjective     Ani Chávez is a 43 y.o. female    History of Present Illness  This is a new patient presenting to the office today to establish care and have her annual GYN exam completed.  Patient does complain of hot flashes that are worse at night.  Other complaints to include headaches and cyclic like pelvic pain and cramping occurring in between cycles.  Patient does wish to discuss hormones.  Gynecologic Exam  The patient's pertinent negatives include no genital itching, genital lesions, genital odor, genital rash, missed menses or vaginal bleeding. The patient is experiencing no pain. She is not pregnant. Pertinent negatives include no anorexia, discolored urine, headaches, joint pain, joint swelling or painful intercourse. Nothing aggravates the symptoms. She is sexually active. It is unknown whether or not her partner has an STD. She uses nothing for contraception. Her menstrual history has been regular. There is no history of an abdominal surgery, a  section, an ectopic pregnancy, endometriosis, a gynecological surgery, herpes simplex, menorrhagia, metrorrhagia, miscarriage, ovarian cysts, perineal abscess, PID, an STD, a terminated pregnancy or vaginosis.     Patient does report that she experiences cyclic pelvic pain in between her cycles, headaches, mood swings, and heavy menstrual bleeding. Patient does wish to discuss hormones and/or initiation of contraception.     Patient's GYN status has been followed by the [facility]. We will have patient sign appropriate form today in-office or at her return to request records to attach to her EMR.      /70   Ht 162.6 cm (64\")   Wt 62.1 kg (137 lb)   LMP 10/04/2023 (Approximate)   BMI 23.52 kg/m²     Outpatient Encounter Medications as of 10/23/2023   Medication Sig Dispense Refill    [DISCONTINUED] ondansetron (Zofran) 4 MG tablet Take 1 tablet by mouth Every 8 (Eight) Hours As Needed for Nausea or Vomiting. 10 tablet 0    " [DISCONTINUED] oxyCODONE-acetaminophen (Percocet) 5-325 MG per tablet Take 1 tablet by mouth Every 6 (Six) Hours As Needed for Moderate Pain. 15 tablet 0     No facility-administered encounter medications on file as of 10/23/2023.       Past Medical History  Past Medical History:   Diagnosis Date    Anxiety     Otosclerosis        Surgical History  Past Surgical History:   Procedure Laterality Date     SECTION      x4    FOOT SURGERY Right     HAND SURGERY Right     HERNIA REPAIR      umbilical hernia    INNER EAR SURGERY      TUBAL ABDOMINAL LIGATION      WRIST GANGLION EXCISION Right 2023    Procedure: RIGHT WRIST EXCISION OF GANGLION CYST;  Surgeon: Clovis Russo MD;  Location: [facility];  Service: Orthopedics;  Laterality: Right;       Family History  Family History   Problem Relation Age of Onset    Uterine cancer Maternal Aunt     Breast cancer Neg Hx     Ovarian cancer Neg Hx     Colon cancer Neg Hx        The following portions of the patient's history were reviewed and updated as appropriate: allergies, current medications, past family history, past medical history, past social history, past surgical history, and problem list.    Review of Systems   Constitutional: Negative.    HENT: Negative.     Eyes: Negative.    Respiratory: Negative.     Cardiovascular: Negative.    Gastrointestinal: Negative.  Negative for anorexia.   Endocrine: Negative.    Genitourinary: Negative.  Negative for menorrhagia and missed menses.   Musculoskeletal: Negative.  Negative for joint pain.   Allergic/Immunologic: Negative.    Neurological: Negative.    Hematological: Negative.    Psychiatric/Behavioral: Negative.         Objective   Physical Exam  Vitals and nursing note reviewed. Exam conducted with a chaperone present.   Constitutional:       General: She is not in acute distress.     Appearance: She is well-developed. She is not diaphoretic.   HENT:      Head: Normocephalic.      Right Ear: External  ear normal.      Left Ear: External ear normal.      Nose: Nose normal.   Eyes:      General: No scleral icterus.        Right eye: No discharge.         Left eye: No discharge.      Conjunctiva/sclera: Conjunctivae normal.      Pupils: Pupils are equal, round, and reactive to light.   Neck:      Thyroid: No thyromegaly.      Vascular: No carotid bruit.      Trachea: No tracheal deviation.   Cardiovascular:      Rate and Rhythm: Normal rate and regular rhythm.      Heart sounds: Normal heart sounds. No murmur heard.  Pulmonary:      Effort: Pulmonary effort is normal. No respiratory distress.      Breath sounds: Normal breath sounds. No wheezing.   Chest:   Breasts:     Breasts are symmetrical.      Right: Normal. No swelling, bleeding, inverted nipple, mass, nipple discharge, skin change or tenderness.      Left: Normal. No swelling, bleeding, inverted nipple, mass, nipple discharge, skin change or tenderness.   Abdominal:      General: There is no distension.      Palpations: Abdomen is soft. There is no mass.      Tenderness: There is no abdominal tenderness. There is no right CVA tenderness, left CVA tenderness or guarding.      Hernia: No hernia is present. There is no hernia in the left inguinal area or right inguinal area.   Genitourinary:     General: Normal vulva.      Exam position: Lithotomy position.      Labia:         Right: No rash, tenderness, lesion or injury.         Left: No rash, tenderness, lesion or injury.       Vagina: Normal. No signs of injury and foreign body. No vaginal discharge, erythema, tenderness or bleeding.      Cervix: Normal.      Uterus: Normal. Not enlarged, not fixed and not tender.       Adnexa: Right adnexa normal and left adnexa normal.        Right: No mass, tenderness or fullness.          Left: No mass, tenderness or fullness.        Rectum: Normal. No mass.      Comments:   BSU normal  Urethral meatus  Normal  Perineum  Normal  Musculoskeletal:         General: No  tenderness. Normal range of motion.      Cervical back: Normal range of motion and neck supple.   Lymphadenopathy:      Head:      Right side of head: No submental, submandibular, tonsillar, preauricular, posterior auricular or occipital adenopathy.      Left side of head: No submental, submandibular, tonsillar, preauricular, posterior auricular or occipital adenopathy.      Cervical: No cervical adenopathy.      Right cervical: No superficial, deep or posterior cervical adenopathy.     Left cervical: No superficial, deep or posterior cervical adenopathy.      Upper Body:      Right upper body: No supraclavicular, axillary or pectoral adenopathy.      Left upper body: No supraclavicular, axillary or pectoral adenopathy.      Lower Body: No right inguinal adenopathy. No left inguinal adenopathy.   Skin:     General: Skin is warm and dry.      Findings: No bruising, erythema, lesion or rash.   Neurological:      Mental Status: She is alert and oriented to person, place, and time.      Coordination: Coordination normal.   Psychiatric:         Mood and Affect: Mood normal.         Behavior: Behavior normal.         Thought Content: Thought content normal.         Judgment: Judgment normal.         PHQ-9 Depression Screening  Little interest or pleasure in doing things? 0-->not at all   Feeling down, depressed, or hopeless? 0-->not at all   Trouble falling or staying asleep, or sleeping too much?     Feeling tired or having little energy?     Poor appetite or overeating?     Feeling bad about yourself - or that you are a failure or have let yourself or your family down?     Trouble concentrating on things, such as reading the newspaper or watching television?     Moving or speaking so slowly that other people could have noticed? Or the opposite - being so fidgety or restless that you have been moving around a lot more than usual?     Thoughts that you would be better off dead, or of hurting yourself in some way?    "  PHQ-9 Total Score 0   If you checked off any problems, how difficult have these problems made it for you to do your work, take care of things at home, or get along with other people?          Assessment & Plan   Diagnoses and all orders for this visit:    1. Annual physical exam (Primary)  Comments:  Patient is here today as a new patient to establish care and have her annual GYN exam. Patient c/o hotflashes, headaches, and cyclic pelvic pain. Previous pap smear collected in 2022 by the [facility] and patient reports her results were \"normal\".    Orders:  -     Liquid-based Pap Smear, Screening  -     POC Pregnancy, Urine    2. Menopausal symptoms  Comments:  Patient c/o hot flashes that are worse at nighttime. Options were discussed to include starting black cohosh, Estrovent, or medications to include Effexor or Prestiq. Patient will get labs today and verbalized she would like to trial Estroven.   Orders:  -     DHEA-Sulfate  -     Cancel: Estradiol  -     Cancel: Follicle Stimulating Hormone  -     Cancel: Luteinizing Hormone  -     Cancel: Progesterone  -     Cancel: Testosterone  -     Cancel: TSH  -     Cancel: T4, Free  -     T3, Uptake    3. Encounter for screening mammogram for breast cancer  Comments:  UTD on mammogram for this year. Order placed for screening mammogram to be completed at [facility] in 2024.  Orders:  -     Mammo Screening Digital Tomosynthesis Bilateral With CAD; Future    4. Encounter for counseling regarding contraception  Comments:  Patient is considering restarting contraception. Options discussed. Patient previously had Mirena in place and tolerated well. Patient may consider this as an option in the near future. UPT negative in-office.   Orders:  -     hCG, Quantitative, Pregnancy  -     POC Pregnancy, Urine    5. Fatigue, unspecified type  Comments:  Patient c/o fatigue that has worsened over the last several months. Labs ordered during " this visit.         Normal GYN exam. Encouraged SBE, pt is aware how to do self breast exam and the importance of same. Discussed weight management and importance of maintaining a healthy weight. Discussed Vitamin D intake and the importance of adequate vitamin D for both bone health and a healthy immune system.  Discussed daily exercise and the importance of same, in regards to a healthy heart as well as helping to maintain her weight and improving her mental health.  Colonoscopy is not indicated. Mammogram is up to date. Bone density is not indicated. Pap smear is done per ASCCP guidelines. HPV is done. Lab work up will be scheduled.      BMI is within normal parameters. No other follow-up for BMI required.      Edna Welch, APRN  10/23/2023

## 2023-10-24 LAB
DHEA-S SERPL-MCNC: 174 UG/DL (ref 57.3–279.2)
DHEA-S SERPL-MCNC: 183 UG/DL (ref 57.3–279.2)
ESTRADIOL SERPL-MCNC: 42.3 PG/ML
FSH SERPL-ACNC: 3.5 MIU/ML
LH SERPL-ACNC: 3.9 MIU/ML
PROGEST SERPL-MCNC: 2.1 NG/ML
T3RU NFR SERPL: 25 % (ref 24–39)
T3RU NFR SERPL: 27 % (ref 24–39)
T4 FREE SERPL-MCNC: 1.18 NG/DL (ref 0.93–1.7)
TESTOST SERPL-MCNC: 7 NG/DL (ref 4–50)
TSH SERPL DL<=0.005 MIU/L-ACNC: 1.02 UIU/ML (ref 0.27–4.2)

## 2023-10-24 PROCEDURE — 87624 HPV HI-RISK TYP POOLED RSLT: CPT

## 2023-10-25 LAB
GEN CATEG CVX/VAG CYTO-IMP: NORMAL
LAB AP CASE REPORT: NORMAL
LAB AP GYN ADDITIONAL INFORMATION: NORMAL
Lab: NORMAL
PATH INTERP SPEC-IMP: NORMAL
STAT OF ADQ CVX/VAG CYTO-IMP: NORMAL

## 2023-10-25 NOTE — ADDENDUM NOTE
Addended by: REYES COVINGTON on: 10/25/2023 10:47 AM     Modules accepted: Orders     [facility]

## 2023-10-30 LAB — HPV I/H RISK 4 DNA CVX QL PROBE+SIG AMP: NOT DETECTED

## 2023-11-27 ENCOUNTER — OFFICE VISIT (OUTPATIENT)
Dept: OBSTETRICS AND GYNECOLOGY | Facility: CLINIC | Age: 43
End: 2023-11-27
Payer: COMMERCIAL

## 2023-11-27 VITALS
DIASTOLIC BLOOD PRESSURE: 78 MMHG | WEIGHT: 135 LBS | HEIGHT: 64 IN | SYSTOLIC BLOOD PRESSURE: 106 MMHG | BODY MASS INDEX: 23.05 KG/M2

## 2023-11-27 DIAGNOSIS — N95.1 MENOPAUSAL SYMPTOMS: Primary | ICD-10-CM

## 2023-11-27 DIAGNOSIS — R79.89 LOW TESTOSTERONE LEVEL IN FEMALE: ICD-10-CM

## 2023-11-27 DIAGNOSIS — N92.6 IRREGULAR PERIODS/MENSTRUAL CYCLES: ICD-10-CM

## 2023-11-27 DIAGNOSIS — R68.82 LOW LIBIDO: ICD-10-CM

## 2023-11-27 PROCEDURE — 1159F MED LIST DOCD IN RCRD: CPT

## 2023-11-27 PROCEDURE — 99212 OFFICE O/P EST SF 10 MIN: CPT

## 2023-11-27 PROCEDURE — 1160F RVW MEDS BY RX/DR IN RCRD: CPT

## 2023-11-27 NOTE — PROGRESS NOTES
Subjective   Ani Chávez is a 43 y.o. female.     History of Present Illness  The patient presents today to discuss her most recent labs that were completed at our last office visit on October 23, 2023. Her labs do reveal a lower than normal testosterone level and she would like to discuss potential treatment options or compounded HRT for this.   Abnormal Lab  This is a recurrent problem. The problem occurs intermittently. The problem has been waxing and waning. Associated symptoms include fatigue and swollen glands. Pertinent negatives include no abdominal pain, anorexia, arthralgias, change in bowel habit, chest pain, chills, congestion, coughing, diaphoresis, fever, headaches, joint swelling, myalgias, nausea, neck pain, numbness, rash, sore throat, urinary symptoms, vertigo, visual change, vomiting or weakness. Nothing aggravates the symptoms. She has tried rest, sleep, relaxation and lying down for the symptoms. The treatment provided no relief.        Review of Systems   Constitutional:  Positive for fatigue. Negative for chills, diaphoresis and fever.   HENT: Negative.  Positive for swollen glands. Negative for congestion and sore throat.    Eyes: Negative.    Respiratory: Negative.  Negative for cough.    Cardiovascular: Negative.  Negative for chest pain.   Gastrointestinal: Negative.  Negative for abdominal pain, anorexia, change in bowel habit, nausea and vomiting.   Endocrine: Negative.    Genitourinary:  Positive for decreased libido and menstrual problem.   Musculoskeletal: Negative.  Negative for arthralgias, joint swelling, myalgias and neck pain.   Skin: Negative.  Negative for rash.   Allergic/Immunologic: Negative.    Neurological: Negative.  Negative for vertigo, weakness and numbness.   Hematological: Negative.    Psychiatric/Behavioral: Negative.       Objective   Physical Exam  Constitutional:       General: She is not in acute distress.     Appearance: Normal appearance. She is not  ill-appearing or diaphoretic.   HENT:      Head: Normocephalic and atraumatic.   Cardiovascular:      Rate and Rhythm: Normal rate and regular rhythm.      Pulses: Normal pulses.      Heart sounds: Normal heart sounds.   Pulmonary:      Effort: Pulmonary effort is normal. No respiratory distress.      Breath sounds: Normal breath sounds.   Abdominal:      General: Abdomen is flat. Bowel sounds are normal.      Palpations: Abdomen is soft.   Musculoskeletal:         General: No deformity.      Cervical back: Normal range of motion.   Skin:     Coloration: Skin is not pale.   Neurological:      General: No focal deficit present.      Mental Status: She is alert.   Psychiatric:         Mood and Affect: Mood normal.         Behavior: Behavior normal.         Thought Content: Thought content normal.         Judgment: Judgment normal.       Assessment & Plan   Diagnoses and all orders for this visit:    1. Menopausal symptoms (Primary)  Comments:  The patient has not noticed any changes in her symptoms since her last visit. She would like to explore her options for HRT at this time. The hormone lab panel completed on October 23, 2023 will be sent to [facility] for review for compounding purposes.     2. Low testosterone level in female  Comments:  Most recent labs reveal low testosterone level. Discussed with the patient potential treatment options. The patient would like to have hormone lab panel sent to [facility] for compounding purposes.     3. Low libido    4. Irregular periods/menstrual cycles  Comments:  The patient reports she has had two periods since our last visit on October 23, 2023.       BMI is within normal parameters. No other follow-up for BMI required.       ALE Grimes

## 2023-11-30 ENCOUNTER — TRANSCRIBE ORDERS (OUTPATIENT)
Dept: ADMINISTRATIVE | Facility: HOSPITAL | Age: 43
End: 2023-11-30
Payer: COMMERCIAL

## 2023-11-30 DIAGNOSIS — M54.16 LUMBAR RADICULOPATHY: Primary | ICD-10-CM

## 2023-12-11 ENCOUNTER — HOSPITAL ENCOUNTER (OUTPATIENT)
Dept: MRI IMAGING | Facility: HOSPITAL | Age: 43
Discharge: HOME | End: 2023-12-11
Admitting: NURSE PRACTITIONER
Payer: COMMERCIAL

## 2023-12-11 DIAGNOSIS — M54.16 LUMBAR RADICULOPATHY: ICD-10-CM

## 2023-12-11 PROCEDURE — 72148 MRI LUMBAR SPINE W/O DYE: CPT

## 2024-01-09 DIAGNOSIS — N95.1 MENOPAUSAL SYMPTOMS: Primary | ICD-10-CM

## 2024-02-28 ENCOUNTER — OFFICE VISIT (OUTPATIENT)
Dept: OBSTETRICS AND GYNECOLOGY | Age: 44
End: 2024-02-28
Payer: COMMERCIAL

## 2024-02-28 VITALS
HEIGHT: 64 IN | DIASTOLIC BLOOD PRESSURE: 64 MMHG | BODY MASS INDEX: 23.05 KG/M2 | SYSTOLIC BLOOD PRESSURE: 114 MMHG | WEIGHT: 135 LBS

## 2024-02-28 DIAGNOSIS — N95.1 MENOPAUSAL SYMPTOMS: Primary | ICD-10-CM

## 2024-02-28 DIAGNOSIS — N90.7 LABIAL CYST: ICD-10-CM

## 2024-02-28 RX ORDER — LORAZEPAM 1 MG/1
TABLET ORAL
COMMUNITY
Start: 2024-02-22

## 2024-02-28 NOTE — PROGRESS NOTES
"Chief Complaint   Patient presents with    Menopause     Patient here for follow up on postmenopausal therapy. HRT started 11/2023.   Feels much better, feels that she has \"her spark back.\"   Does have an ingrown hair or cyst on labia. Requesting to look at it.        History:  Ani Chávez is a 43 y.o. female who presents today for follow-up for evaluation of the above:  Compounded HRT 3 month follow up and examination of labial cyst.     The patient presents to [facility] today for her 3 month follow up after starting compounded hrt through [facility]. The patient reports her symptoms have improved and she feels as though she has her \"spark\" back. She would like to continue use of this. Additionally, she has a right labial cyst area that has been present \"for months\".         ROS:  Review of Systems   Constitutional: Negative.    HENT: Negative.     Eyes: Negative.    Respiratory: Negative.     Cardiovascular: Negative.    Gastrointestinal: Negative.    Endocrine: Negative.    Genitourinary: Negative.  Negative for vaginal discharge and vaginal pain.   Musculoskeletal: Negative.    Skin:  Positive for skin lesions (right labial cyst, non-erythematous, movable, pea-sized area).   Allergic/Immunologic: Negative.    Neurological: Negative.    Hematological: Negative.    Psychiatric/Behavioral: Negative.         Ms. Chávez  reports that she has been smoking cigarettes. She has a 2.50 pack-year smoking history. She has never used smokeless tobacco. She reports current alcohol use. She reports current drug use. Frequency: 2.00 times per week. Drug: Marijuana.      Current Outpatient Medications:     LORazepam (ATIVAN) 1 MG tablet, , Disp: , Rfl:     Progesterone 30 mg, Testosterone 0.3 mg, Progesterone 30 mg/ Testosterone 0.3 mg per 0.25ml. Apply 1 click to labia or inner thigh QD, Disp: 30 g, Rfl: 3      OBJECTIVE:  /64   Ht 162.6 cm (64\")   Wt 61.2 kg (135 lb)   Breastfeeding No   BMI 23.17 " "kg/m²    Physical Exam  Vitals and nursing note reviewed. Exam conducted with a chaperone present.   Constitutional:       General: She is not in acute distress.     Appearance: Normal appearance. She is not ill-appearing or diaphoretic.   HENT:      Head: Normocephalic and atraumatic.   Pulmonary:      Effort: Pulmonary effort is normal. No respiratory distress.   Genitourinary:     General: Normal vulva.      Exam position: Lithotomy position.      Labia:         Right: Lesion present.       Comments: Right labial majora cyst that is pea-sized, movable, non-erythematous, dry and intact. Patient denies pain or tenderness at the site.   Musculoskeletal:         General: No deformity.      Cervical back: Normal range of motion.   Skin:     Coloration: Skin is not pale.   Neurological:      General: No focal deficit present.      Mental Status: She is alert.   Psychiatric:         Mood and Affect: Mood normal.         Behavior: Behavior normal.         Thought Content: Thought content normal.         Judgment: Judgment normal.       Assessment/Plan    Diagnoses and all orders for this visit:    1. Menopausal symptoms (Primary)  Comments:  The patient presents to [facility] and here today for follow up on compounded HRT. The patient reports her symptoms have improved and she has her \"spark back\". She would like to continue use of this. Three month refill will be sent to [facility] and she will return in 3 months for follow up.     2. Labial cyst  Comments:  Pea-sized, moveable cyst noted to external labia majora. The patient reports this cystic area has been present for \"months\". The area is dry and intact, non-erythematous, and non-tender to touch. The patient was unsure if this area was attributed to shaving. I did offer in the office to perform an I&D but she declines this today. I encouraged her to return to the office if the area changes in size, begins to drain, or becomes painful. She is agreeable to this plan.      "      An After Visit Summary was printed and given to the patient at discharge.  Return in about 3 months (around 5/28/2024) for Recheck. Sooner if problems arise.          Edna Welch, APRN

## 2024-03-19 ENCOUNTER — TRANSCRIBE ORDERS (OUTPATIENT)
Dept: ADMINISTRATIVE | Facility: HOSPITAL | Age: 44
End: 2024-03-19
Payer: COMMERCIAL

## 2024-03-19 ENCOUNTER — HOSPITAL ENCOUNTER (OUTPATIENT)
Dept: GENERAL RADIOLOGY | Facility: HOSPITAL | Age: 44
Discharge: HOME | End: 2024-03-19
Admitting: FAMILY MEDICINE
Payer: COMMERCIAL

## 2024-03-19 DIAGNOSIS — M54.6 PAIN IN THORACIC SPINE: Primary | ICD-10-CM

## 2024-03-19 DIAGNOSIS — M54.6 PAIN IN THORACIC SPINE: ICD-10-CM

## 2024-03-19 PROCEDURE — 72082 X-RAY EXAM ENTIRE SPI 2/3 VW: CPT

## (undated) DEVICE — ADHS SKIN PREMIERPRO EXOFIN TOPICAL HI/VISC .5ML

## (undated) DEVICE — GLV SURG SENSICARE PI ORTHO SZ8 LF STRL

## (undated) DEVICE — CVR BRD ARM 13X30

## (undated) DEVICE — BLD SCLPL BEAVR MINI STR 2BVL 180D LF

## (undated) DEVICE — PK EXTRM 30

## (undated) DEVICE — ANTIBACTERIAL UNDYED BRAIDED (POLYGLACTIN 910), SYNTHETIC ABSORBABLE SUTURE: Brand: COATED VICRYL

## (undated) DEVICE — TRAP FLD MINIVAC MEGADYNE 100ML

## (undated) DEVICE — BNDG ESMARK 4IN 9FT LF STRL BLU

## (undated) DEVICE — BAPTIST TURNOVER KIT: Brand: MEDLINE INDUSTRIES, INC.

## (undated) DEVICE — UNDERCAST PADDING: Brand: DEROYAL

## (undated) DEVICE — BNDG ELAS SUREWRAP W/CLIP 3IN 5YD LF

## (undated) DEVICE — DISPOSABLE TOURNIQUET CUFF SINGLE BLADDER, SINGLE PORT AND QUICK CONNECT CONNECTOR: Brand: COLOR CUFF

## (undated) DEVICE — INTENDED FOR TISSUE SEPARATION, AND OTHER PROCEDURES THAT REQUIRE A SHARP SURGICAL BLADE TO PUNCTURE OR CUT.: Brand: BARD-PARKER ® STAINLESS STEEL BLADES

## (undated) DEVICE — 4-PORT MANIFOLD: Brand: NEPTUNE 2

## (undated) DEVICE — GLV SURG DERMASSURE GRN LF PF 8.0